# Patient Record
Sex: FEMALE
[De-identification: names, ages, dates, MRNs, and addresses within clinical notes are randomized per-mention and may not be internally consistent; named-entity substitution may affect disease eponyms.]

---

## 2018-10-21 ENCOUNTER — HOSPITAL ENCOUNTER (EMERGENCY)
Dept: HOSPITAL 42 - ED | Age: 58
Discharge: HOME | End: 2018-10-21
Payer: COMMERCIAL

## 2018-10-21 VITALS
TEMPERATURE: 98.4 F | HEART RATE: 71 BPM | DIASTOLIC BLOOD PRESSURE: 73 MMHG | SYSTOLIC BLOOD PRESSURE: 137 MMHG | OXYGEN SATURATION: 98 %

## 2018-10-21 VITALS — RESPIRATION RATE: 18 BRPM

## 2018-10-21 VITALS — BODY MASS INDEX: 40.7 KG/M2

## 2018-10-21 DIAGNOSIS — M25.551: ICD-10-CM

## 2018-10-21 DIAGNOSIS — M25.521: ICD-10-CM

## 2018-10-21 DIAGNOSIS — M25.561: Primary | ICD-10-CM

## 2018-10-21 DIAGNOSIS — M25.571: ICD-10-CM

## 2018-10-21 DIAGNOSIS — M25.511: ICD-10-CM

## 2018-10-21 PROCEDURE — 96372 THER/PROPH/DIAG INJ SC/IM: CPT

## 2018-10-21 PROCEDURE — 73610 X-RAY EXAM OF ANKLE: CPT

## 2018-10-21 PROCEDURE — 73080 X-RAY EXAM OF ELBOW: CPT

## 2018-10-21 PROCEDURE — 70450 CT HEAD/BRAIN W/O DYE: CPT

## 2018-10-21 PROCEDURE — 73562 X-RAY EXAM OF KNEE 3: CPT

## 2018-10-21 PROCEDURE — 73030 X-RAY EXAM OF SHOULDER: CPT

## 2018-10-21 PROCEDURE — 73502 X-RAY EXAM HIP UNI 2-3 VIEWS: CPT

## 2018-10-21 PROCEDURE — 99284 EMERGENCY DEPT VISIT MOD MDM: CPT

## 2018-10-21 NOTE — ED PDOC
Arrival/HPI





- General


Historian: Patient





- History of Present Illness


Narrative History of Present Illness (Text): 





10/21/18 22:04


58yr old female presents today With right shoulder, right elbow, right hip, 

right knee, right ankle pain status post fall. Patient states around 5 PM she 

was walking tripped over a toy and fell landing on her right side. Patient 

denies hitting her head. She denies loss of consciousness. She denies headaches 

dizziness or weakness. Patient denies neck or back pain. Patient denies 

abdominal pain. No chest pain or shortness of breath. No medications have been 

taken for pain at home. Patient states 6 weeks ago she had right shoulder 

surgery for rotator cuff. Patient states she has Vicodin at home that she 

doesn't take, but it was given to her after her shoulder surgery. No other 

complaints. 





<Susy Lawson - Last Filed: 10/21/18 23:42>





<Viktor Butts - Last Filed: 10/22/18 00:06>





- General


Chief Complaint: Trauma


Time Seen by Provider: 10/21/18 21:41





Past Medical History





- Provider Review


Nursing Documentation Reviewed: Yes





- Travel History


Have you recently traveled outside [place] w/in the past 3 mons?: No





- Infectious Disease


Hx of Infectious Diseases: None





- Cardiac


Hx Hypertension: Yes





- Musculoskeletal/Rheumatological


Hx Arthritis: Yes





- Psychiatric


Hx Depression: No


Hx Emotional Abuse: No


Hx Physical Abuse: No


Hx Substance Use: No





- Anesthesia


Hx Anesthesia: No





- Suicidal Assessment


Feels Threatened In Home Enviroment: No





<Susy Lawson - Last Filed: 10/21/18 23:42>





Family/Social History





- Physician Review


Nursing Documentation Reviewed: Yes


Family/Social History: Unknown Family HX


Smoking Status: Unknown If Ever Smoked


Hx Alcohol Use: No


Hx Substance Use: No


Hx Substance Use Treatment: No





<Susy Lawson - Last Filed: 10/21/18 23:42>





Allergies/Home Meds





<Susy Lawson - Last Filed: 10/21/18 23:42>





<Viktor Butts - Last Filed: 10/22/18 00:06>


Allergies/Adverse Reactions: 


Allergies





latex Allergy (Verified 10/21/18 21:51)


   ANAPHYLAXIS


pineapple Allergy (Verified 10/21/18 21:51)


   RASH








Home Medications: 


                                    Home Meds











 Medication  Instructions  Recorded  Confirmed


 


Bisoprolol [Zebeta] 5 mg PO DAILY 10/21/18 10/21/18


 


Hydrochlorothiazide [Microzide] 25 mg PO DAILY 10/21/18 10/21/18


 


Omeprazole 40 mg PO DAILY 10/21/18 10/21/18


 


Sitagliptin Phos/Metformin HCl 1 each PO DAILY 10/21/18 10/21/18





[Janumet  mg Tablet]   














Review of Systems





- Review of Systems


Constitutional: absent: Fatigue, Fevers


Respiratory: absent: SOB, Cough


Cardiovascular: absent: Chest Pain, Palpitations


Gastrointestinal: absent: Abdominal Pain, Constipation, Diarrhea, Nausea, 

Vomiting


Musculoskeletal: Arthralgias.  absent: Back Pain, Neck Pain


Skin: absent: Rash, Pruritis


Neurological: absent: Headache, Dizziness


Psychiatric: absent: Anxiety, Depression, Suicidal Ideation





<Susy Lawosn T - Last Filed: 10/21/18 23:42>





Physical Exam


Vital Signs Reviewed: Yes





Vital Signs











  Temp Pulse Resp BP Pulse Ox


 


 10/21/18 21:51  99 F  74  18  133/84  97











Temperature: Afebrile


Blood Pressure: Normal


Pulse: Regular


Respiratory Rate: Normal


Appearance: Positive for: Well-Appearing, Non-Toxic, Comfortable


Pain Distress: None


Mental Status: Positive for: Alert and Oriented X 3





- Systems Exam


Head: Present: Atraumatic


Extroacular Muscles: Present: EOMI


Mouth: Present: Moist Mucous Membranes


Neck: Present: Normal Range of Motion, Trachea Midline.  No: MIDLINE TENDERNESS,

Paraspinal Tenderness


Respiratory/Chest: Present: Clear to Auscultation, Good Air Exchange.  No: 

Respiratory Distress, Accessory Muscle Use, Tender to Palpation


Cardiovascular: Present: Regular Rate and Rhythm, Normal S1, S2.  No: Murmurs


Abdomen: No: Tenderness, Distention, Rebound, Guarding


Back: Present: Normal Inspection, Other (no ecchymosis no edema, no erythema. ).

 No: Midline Tenderness, Paraspinal Tenderness


Upper Extremity: Present: Normal ROM, NORMAL PULSES, Tenderness (right shoulder;

+ ttp over anterior aspect of shoulder. full rom of shoulder with pain; 

sensation and distal  pulses in tact; cap refill <2.  right elbow; + ttp over 

olecranon process; no edema, no erythema; no ecchymosis; full rom of elbow;  

wrist non tender. ), Neurovascularly Intact, Capillary Refill < 2s.  No: 

Swelling, Erythema, Deformity


Lower Extremity: Present: NORMAL PULSES, Tenderness (pelvis stable;  right hip +

ttp over anteriolateral aspect of hip; no edema, no erythema; no ecchymosis; 

limited abduction.  right knee; + edema and ecchymosis noted to the lateral 

aspect of the knee; limited flexion of knee with pain. no calf tenderness.  

right ankle; + TTP over lateral malleolus; full rom of ankle; no dorsal foot 

tenderness. no ecchymosis; no erythema; sensation and distal pulses in tact. cap

refill <2.  ), Swelling, Neurovascularly Intact, Capillary Refill < 2 s.  No: 

CALF TENDERNESS, Normal ROM, Erythema


Neurological: Present: GCS=15, Speech Normal


Skin: Present: Warm, Dry, Normal Color.  No: Rashes


Psychiatric: Present: Alert, Oriented x 3





<Susy Lawson - Last Filed: 10/21/18 23:42>





Vital Signs











  Temp Pulse Resp BP Pulse Ox


 


 10/21/18 23:06  98.4 F  71  18  137/73  98


 


 10/21/18 21:51  99 F  74  18  133/84  97














<Viktor Butts - Last Filed: 10/22/18 00:06>





Medical Decision Making


ED Course and Treatment: 





10/21/18 22:17


58yr old female with right sided arm and leg pain s/p fall. 








head ct; FINDINGS:


BRAIN


No acute intraparenchymal hemorrhage. No mass lesion. No CT evidence for acute 

territorial infarct. No midline shift or extra-axial collections. 


VENTRICLES:


No hydrocephalus. 


ORBITS:


The orbits are unremarkable. 


SINUSES AND MASTOIDS:


Minimal mucosal thickening in the inferior left maxillary sinus. The paranasal 

sinuses and mastoid air cells are otherwise clear. 


BONES:


No fracture. 


SOFT TISSUES:


Unremarkable. 


IMPRESSION:


No acute intracranial abnormality.





right shoulder xray: no fracture


right elbow xray:  no fracture





right hip xray: no fracture


right knee xray: no fracture


right ankle xray; no fracture








after negative head ct; toradol given for pain. 





pt given knee immobilizer. 





i discussed all results in depth with patient and advised f/u with orthopedist 

within the next 2 days. 





Patient verbalizes understanding of discharge instructions and need for 

immediate followup.

















impression; fall, contusion, shoulder, elbow, hip, knee, ankle. 


motrin every 6 hours as needed for pain


rest, ice, compression, elevation


follow up with the orthopedist within the next 2 days. 


Follow up with the primary care physician within the next 2 days. 


return if symptoms worsen,persist or if new symptoms develop. 








- RAD Interpretation


Radiology Orders: 











10/21/18 21:54


HEAD W/O CONTRAST [CT] Stat 





10/21/18 21:57


ANKLE RIGHT 3 VIEWS ROUTINE [RAD] Stat 


ELBOW RIGHT 3 VIEWS ROUTINE [RAD] Stat 


HIP MIN 2V W/ PELVIS RT [RAD] Stat 


KNEE W PATELLA RIGHT 3 VIEW [RAD] Stat 


SHOULDER RIGHT [RAD] Stat 














<Susy Lawson - Last Filed: 10/21/18 23:42>





- RAD Interpretation


Radiology Orders: 











10/21/18 21:54


HEAD W/O CONTRAST [CT] Stat 





10/21/18 21:57


ANKLE RIGHT 3 VIEWS ROUTINE [RAD] Stat 


ELBOW RIGHT 3 VIEWS ROUTINE [RAD] Stat 


HIP MIN 2V W/ PELVIS RT [RAD] Stat 


KNEE W PATELLA RIGHT 3 VIEW [RAD] Stat 


SHOULDER RIGHT [RAD] Stat 














- Medication Orders


Current Medication Orders: 














Discontinued Medications





Ketorolac Tromethamine (Toradol)  60 mg IM STAT STA


   Stop: 10/21/18 23:17


   Last Admin: 10/21/18 23:28  Dose: 60 mg





MAR Pain Assessment


 Document     10/21/18 23:28  RD  (Rec: 10/21/18 23:28  RD  [id number])


     Pain Reassessment


      Is this a pain reassessment?               No


     Sleep


      Is patient sleeping during reassessment?   No


     Presence of Pain


      Presence of Pain                           Yes


     Location


      Left, Right or Bilateral                   Right


      Pain Location Body Site                    Shoulder


                                                 Elbow


                                                 Hip


                                                 Knee


                                                 Ankle


IM Administration Charges


 Document     10/21/18 23:28  RD  (Rec: 10/21/18 23:28  RD  [id number])


     Injection Site


      MAR Injection Site                         Left Deltoid


     Charges for Administration


      # of IM Administrations                    1














<Viktor Butts - Last Filed: 10/22/18 00:06>





- PA / NP / Resident Statement


MD/ has reviewed & agrees with the documentation as recorded.





<Viktor Butts - Last Filed: 10/22/18 00:06>





Disposition/Present on Arrival





- Present on Arrival


Any Indicators Present on Arrival: Yes


History of DVT/PE: No


History of Uncontrolled Diabetes: No


Urinary Catheter: No


History of Decub. Ulcer: No


History Surgical Site Infection Following: None





- Disposition


Have Diagnosis and Disposition been Completed?: Yes


Disposition Time: 22:29


Patient Plan: Discharge





<Susy Lawson - Last Filed: 10/21/18 23:42>





<BeckieViktor - Last Filed: 10/22/18 00:06>





- Disposition


Diagnosis: 


 Knee pain, Elbow pain, Hip pain, Shoulder pain, Ankle pain





Disposition: HOME/ ROUTINE


Patient Problems: 


                             Current Active Problems











Problem Status Onset


 


Ankle pain Acute 


 


Elbow pain Acute 


 


Hip pain Acute 


 


Knee pain Acute 


 


Shoulder pain Acute 











Condition: GOOD


Discharge Instructions (ExitCare):  Hip Pain (DC), Shoulder Pain (DC), Knee Pain

 (DC)


Additional Instructions: 


motrin every 6 hours as needed for pain


rest, ice, compression, elevation


follow up with the orthopedist within the next 2 days. 


Follow up with the primary care physician within the next 2 days. 


return if symptoms worsen,persist or if new symptoms develop.


Prescriptions: 


Ibuprofen [Motrin] 600 mg PO Q6H PRN #20 tab


 PRN Reason: pain/fever reduction


Referrals: 


Pippa Garcia MD [Primary Care Provider] - Follow up with primary


Bryan Peter DO [Staff Provider] - Follow up with primary


Forms:  [facility] (English), WORK NOTE

## 2018-10-22 NOTE — RAD
Date of service: 



10/21/2018



PROCEDURE:  Right hip



HISTORY:

fall



COMPARISON:

12/27/2016 pelvis and right hip



TECHNIQUE:

Standard protocol for this study/examination.



FINDINGS:

There are no osseous abnormalities to suggest fracture. The pelvic 

ring is intact. Preserved femoral-acetabular relationship. Negative 

study for protrusio, subluxation or dislocation.  Degenerative 

changes: Mild. 



Stable position of orthopedic hardware lower lumbar spine as 

visualized. 



IMPRESSION:

No acute findings related to/accounting  for the clinical 

presentation. No significant interval change compared to the prior 

examination(s).

## 2018-10-22 NOTE — RAD
Date of service: 



10/21/2018



PROCEDURE:  Right Knee Radiographs.



HISTORY:

knee pain



COMPARISON:

None.



FINDINGS:



BONES:

No acute fracture. Proliferative hypertrophic changes emanating from 

the femoral condyle and tibial plateau regions. 



JOINTS:

Mild tricompartmental degenerative change.  Evidence of 

chondrocalcinosis a normal variant. 



JOINT EFFUSION:

None. 



OTHER FINDINGS:

None.



IMPRESSION:

No acute findings related to/accounting  for the clinical 

presentation. Additional benign and/or incidental findings described 

above.

## 2018-10-22 NOTE — RAD
Date of service: 



10/21/2018



PROCEDURE:  Right Ankle Radiographs.



HISTORY:

 ankle pain 



COMPARISON:

None



FINDINGS:



BONES:

Plantar and Achilles Tendon insertion calcaneal spurs.  



JOINTS:

Normal. No osteoarthritis. Ankle mortise maintained. Talar dome intact



SOFT TISSUES:

Normal. 



OTHER FINDINGS:

None.



IMPRESSION:

Normal right ankle radiographs.

## 2018-10-22 NOTE — CT
Date of service: 



10/21/2018



PROCEDURE:  CT HEAD WITHOUT CONTRAST.



HISTORY:

Status post fall 



COMPARISON:

Comparison made with prior CT scan brain 12/27/2016.



TECHNIQUE:

Axial computed tomography images were obtained through the head/brain 

without intravenous contrast.  



Radiation dose:



Total exam DLP = 794.43 mGy-cm.



This CT exam was performed using one or more of the following dose 

reduction techniques: Automated exposure control, adjustment of the 

mA and/or kV according to patient size, and/or use of iterative 

reconstruction technique.



FINDINGS:



HEMORRHAGE:

No acute parenchymal, subarachnoid or extra-axial hemorrhage. 



BRAIN:

Minimal chronic periventricular white matter ischemic changes seen 

extending peripherally into the deep and subcortical white matter 

both cerebral hemispheres...  Suspect few tiny chronic bilateral 

basal nuclei lacunar type infarcts may be present 



No obvious parenchymal nor extra-axial mass or collection seen on 

this noncontrast exam.  



Mild generalized volume loss.  Minor vascular calcifications both 

carotid siphons 



VENTRICLES:

No obstructive hydrocephalus. 



CALVARIUM:

There are no acute calvarial fractures.



PARANASAL SINUSES:

Minimal mucosal thickening left maxillary antrum



MASTOID AIR CELLS:

Unremarkable as visualized. No inflammatory changes.



OTHER FINDINGS:

Interval bilateral cataract surgery.



IMPRESSION:

No acute intracranial hemorrhage.  Minimal chronic periventricular 

white matter ischemic changes.  Suspect few chronic bilateral basal 

nuclei lacunar type infarcts 



Mild generalized volume loss

## 2018-10-22 NOTE — RAD
Date of service: 



10/21/2018



PROCEDURE:  Radiographs of the right elbow.



HISTORY:

 fall 



COMPARISON:

No prior.



FINDINGS:



BONES:

Normal. No fracture.



JOINTS:

Normal. No osteoarthritis.



SOFT TISSUES:

Normal.



JOINT EFFUSION:

None.



OTHER FINDINGS:

None.



IMPRESSION:

Unremarkable radiographs of the right elbow.

## 2018-10-22 NOTE — RAD
Date of service: 



10/21/2018



PROCEDURE:  Radiographs of the Right Shoulder



HISTORY:

fall







COMPARISON:

No prior.



FINDINGS:



BONES:

Normal. No fracture.



JOINTS:

Preserved glenohumeral relationship, acromioclavicular degenerative 

change: Mild.



SOFT TISSUES:

Normal.



OTHER FINDINGS:

None. 



IMPRESSION:

No acute findings related to/accounting  for the clinical 

presentation.